# Patient Record
Sex: FEMALE | NOT HISPANIC OR LATINO | URBAN - NONMETROPOLITAN AREA
[De-identification: names, ages, dates, MRNs, and addresses within clinical notes are randomized per-mention and may not be internally consistent; named-entity substitution may affect disease eponyms.]

---

## 2024-08-08 ENCOUNTER — APPOINTMENT (OUTPATIENT)
Dept: CARDIOLOGY | Facility: CLINIC | Age: 64
End: 2024-08-08
Payer: MEDICARE

## 2024-08-08 VITALS
DIASTOLIC BLOOD PRESSURE: 84 MMHG | SYSTOLIC BLOOD PRESSURE: 152 MMHG | BODY MASS INDEX: 26.29 KG/M2 | WEIGHT: 154 LBS | HEART RATE: 100 BPM | HEIGHT: 64 IN

## 2024-08-08 DIAGNOSIS — F17.210 CIGARETTE NICOTINE DEPENDENCE, UNCOMPLICATED: ICD-10-CM

## 2024-08-08 DIAGNOSIS — R07.9 CHEST PAIN, UNSPECIFIED TYPE: ICD-10-CM

## 2024-08-08 DIAGNOSIS — E78.2 MIXED HYPERLIPIDEMIA: ICD-10-CM

## 2024-08-08 DIAGNOSIS — M32.9 SYSTEMIC LUPUS ERYTHEMATOSUS, UNSPECIFIED SLE TYPE, UNSPECIFIED ORGAN INVOLVEMENT STATUS (MULTI): Chronic | ICD-10-CM

## 2024-08-08 DIAGNOSIS — I25.10 CORONARY ARTERIOSCLEROSIS IN NATIVE ARTERY: Chronic | ICD-10-CM

## 2024-08-08 DIAGNOSIS — Z95.5 HISTORY OF HEART ARTERY STENT: ICD-10-CM

## 2024-08-08 DIAGNOSIS — R94.31 ABNORMAL EKG: ICD-10-CM

## 2024-08-08 DIAGNOSIS — I10 ESSENTIAL HYPERTENSION: Chronic | ICD-10-CM

## 2024-08-08 DIAGNOSIS — J44.9 CHRONIC OBSTRUCTIVE PULMONARY DISEASE, UNSPECIFIED COPD TYPE (MULTI): ICD-10-CM

## 2024-08-08 DIAGNOSIS — Z13.6 SCREENING FOR AAA (ABDOMINAL AORTIC ANEURYSM): ICD-10-CM

## 2024-08-08 DIAGNOSIS — I21.9 MYOCARDIAL INFARCTION, UNSPECIFIED MI TYPE, UNSPECIFIED ARTERY (MULTI): ICD-10-CM

## 2024-08-08 DIAGNOSIS — Z79.4 TYPE 2 DIABETES MELLITUS WITH OTHER CIRCULATORY COMPLICATION, WITH LONG-TERM CURRENT USE OF INSULIN (MULTI): Chronic | ICD-10-CM

## 2024-08-08 DIAGNOSIS — E11.59 TYPE 2 DIABETES MELLITUS WITH OTHER CIRCULATORY COMPLICATION, WITH LONG-TERM CURRENT USE OF INSULIN (MULTI): Chronic | ICD-10-CM

## 2024-08-08 DIAGNOSIS — I73.9 PVD (PERIPHERAL VASCULAR DISEASE) (CMS-HCC): ICD-10-CM

## 2024-08-08 PROBLEM — E78.5 HYPERLIPIDEMIA, UNSPECIFIED: Status: ACTIVE | Noted: 2023-07-07

## 2024-08-08 PROCEDURE — 3078F DIAST BP <80 MM HG: CPT | Performed by: INTERNAL MEDICINE

## 2024-08-08 PROCEDURE — 1159F MED LIST DOCD IN RCRD: CPT | Performed by: INTERNAL MEDICINE

## 2024-08-08 PROCEDURE — 3077F SYST BP >= 140 MM HG: CPT | Performed by: INTERNAL MEDICINE

## 2024-08-08 PROCEDURE — 93000 ELECTROCARDIOGRAM COMPLETE: CPT | Performed by: INTERNAL MEDICINE

## 2024-08-08 PROCEDURE — 99204 OFFICE O/P NEW MOD 45 MIN: CPT | Performed by: INTERNAL MEDICINE

## 2024-08-08 PROCEDURE — 4004F PT TOBACCO SCREEN RCVD TLK: CPT | Performed by: INTERNAL MEDICINE

## 2024-08-08 PROCEDURE — 4010F ACE/ARB THERAPY RXD/TAKEN: CPT | Performed by: INTERNAL MEDICINE

## 2024-08-08 RX ORDER — INSULIN DEGLUDEC 100 U/ML
20 INJECTION, SOLUTION SUBCUTANEOUS
COMMUNITY
Start: 2023-11-22

## 2024-08-08 RX ORDER — HYDROXYCHLOROQUINE SULFATE 200 MG/1
200 TABLET, FILM COATED ORAL
COMMUNITY

## 2024-08-08 RX ORDER — VIT C/E/ZN/COPPR/LUTEIN/ZEAXAN 250MG-90MG
1000 CAPSULE ORAL
COMMUNITY

## 2024-08-08 RX ORDER — ROSUVASTATIN CALCIUM 10 MG/1
10 TABLET, COATED ORAL
COMMUNITY
Start: 2024-07-30

## 2024-08-08 RX ORDER — ASPIRIN 81 MG/1
81 TABLET ORAL DAILY
COMMUNITY

## 2024-08-08 RX ORDER — CEPHALEXIN 250 MG/1
1 CAPSULE ORAL
COMMUNITY
Start: 2024-03-14

## 2024-08-08 RX ORDER — LOSARTAN POTASSIUM 50 MG/1
100 TABLET ORAL DAILY
COMMUNITY
Start: 2024-03-14 | End: 2025-03-14

## 2024-08-08 RX ORDER — NITROGLYCERIN 0.4 MG/1
0.4 TABLET SUBLINGUAL EVERY 5 MIN PRN
Qty: 15 TABLET | Refills: 1 | Status: SHIPPED | OUTPATIENT
Start: 2024-08-08 | End: 2025-08-08

## 2024-08-08 RX ORDER — INSULIN ASPART 100 [IU]/ML
INJECTION, SOLUTION INTRAVENOUS; SUBCUTANEOUS
COMMUNITY
Start: 2023-11-22

## 2024-08-08 NOTE — LETTER
August 8, 2024     Renay Lazcano MD  [address] [facility] [address]    Patient: Jayla Patel   YOB: 1960   Date of Visit: 8/8/2024       Dear Dr. Renay Lazcano MD:    Thank you for referring Jayla Patel to me for evaluation. Below are my notes for this consultation.  If you have questions, please do not hesitate to call me. I look forward to following your patient along with you.       Sincerely,     Autumn Yeboah MD      CC: No Recipients  ______________________________________________________________________________________    Referred Alysa  for New Patient Visit (CP HTN)     History Of Present Illness:    Jayla Patel is a 63 y.o. female presenting with multiple cardiac risk factors is accompanied by her  Victor Manuel Patel to the office.  Patient has history of atherosclerotic native vessel coronary artery disease with remote anterior myocardial infarction, she says she has 1 stent as a result.  She has hypertension insulin requiring diabetes, she is also a smoker who is trying to cut back, currently 5 cigarettes a day.    Recently saw primary MD regarding left-sided chest and arm discomfort.  Patient says it was an aching sensation that would come and go without any specific reason.  At times she felt that the left side of her face would go numb.  The symptoms have abated with discontinuation of atorvastatin, and initiation of rosuvastatin.  Patient had STEPHANE of both lower extremities which shows some evidence of peripheral vascular disease, she does not complain of classic claudication type symptoms, but does have aches and pains in multiple joints.  She gives a history of connective tissue disorder, lupus.  She is status post partial hysterectomy, left-sided lung surgery for collapsed lung, bilateral rotator cuff repair.  Her brother had heart attack in his late 50s/early 60s.  12 point review of systems is negative or noncontributory except as noted in particular  she denies palpitations lightheadedness presyncope syncope orthopnea PND lower extremity edema bleeding diathesis or claudication.  She does more than 4 METS of activity on a regular basis..      Past Medical History:-Per review of primary care MDs notes    Acute myocardial infarction (CMS/[facility])   unspecified site, episode of care unspecified   Chronic airway obstruction (CMS/[facility])   not elsewhere classified   Colon polyps    Constipation   Coronary atherosclerosis (CMS/[facility])   Coronary atherosclerosis of unspecified type of vessel, native or graft   Diabetes mellitus (CMS/[facility])   without mention of complication, type II or unspecified type, not stated as uncontrolled   Disorder of bone and cartilage   unspecified   Essential hypertension (CMS/[facility])   unspecified   Gastroparesis   MI (myocardial infarction) (CMS/[facility]) 2013   History of hospitalization   Neurogenic bladder   NOS   Nondependent tobacco use disorder   Other chest pain 2018   Pain in joint, multiple sites   Sjogren's syndrome (CMS/[facility])   Symptomatic menopausal or female climacteric states   Systemic lupus erythematosus (CMS/[facility])   Visual disturbances   Past Surgical History:  She has a past surgical history that includes Hysterectomy;  section, classic; Rotator cuff repair (Bilateral); Lung surgery; Coronary stent placement; Cardiac catheterization; Carpal tunnel release (Left); and Colonoscopy.      Social History:  She reports that she has been smoking cigarettes. She has never used smokeless tobacco. She reports current alcohol use. She reports that she does not use drugs.    Family History:  Family History   Problem Relation Name Age of Onset   • Diabetes Mother     • Esophageal cancer Father     • Lung cancer Father     • Diabetes Brother          Allergies:  Lisinopril, Sulfa (sulfonamide antibiotics), Ciprofloxacin, and Morphine intolerance to atorvastatin    Outpatient Medications:  Current Outpatient Medications   Medication  "Instructions   • aspirin 81 mg, oral, Daily   • cephalexin (Keflex) 250 mg capsule 1 capsule, oral, Daily before breakfast   • cholecalciferol (VITAMIN D-3) 1,000 Units, oral, Daily RT   • hydroxychloroquine (PLAQUENIL) 200 mg, oral, Daily RT   • insulin degludec (TRESIBA FLEXTOUCH) 20 Units, subcutaneous   • losartan (COZAAR) 50 mg, oral, Daily RT   • NovoLOG Flexpen U-100 Insulin 100 unit/mL (3 mL) pen Use 3 units before meals and per ISS sliding scale, 1 unit for every 10 carbs   • rosuvastatin (CRESTOR) 10 mg, oral, Daily RT        Last Recorded Vitals:  Vitals:    08/08/24 1457 08/08/24 1501 08/08/24 1600 08/08/24 1601   BP: 150/80 144/72 162/86 152/84   BP Location: Left arm Right arm Right arm Left arm   Patient Position: Sitting Sitting Sitting Sitting   Pulse: 100 100     Weight: 69.9 kg (154 lb)      Height: 1.613 m (5' 3.5\")          Physical Exam:    GENERAL APPEARANCE: Well developed, well nourished, in no acute distress.  CHEST: Symmetric and non-tender.  Well-healed left lateral chest wall scar of prior surgery for collapsed lung  INTEGUMENT: Skin warm and dry, without gross excoriationis or lesions.  HEENT: No gross abnormalities, no jugular venous distention no carotid bruit or scleral icterus  NECK: Supple, no JVD, no bruit. Thyroid not palpable. Carotid upstrokes normal.  NEURO/PSHCY: Alert and oriented x3; appropriate behavior and responses and responses, with normal balance and coordination  LUNGS: Clear to auscultation bilaterally; normal respiratory effort.  Breath sounds are distant  HEART: Rate and rhythm regular with no evident murmur; no gallop appreciated. There are no rubs, clicks or heaves.   ABDOMEN: Soft, nontender, no masses  or bruits.   MUSCULOSKELETAL: No obvious deformity identified  EXTREMITIES: There is no edema noted.  PERIPHERAL VASCULAR: Able to Doppler dorsalis pedis and posterior tibial in both lower extremities on the right side 2 to 3+ out of 4 Doppler signal on the left " 2+ out of 4 3.  Femoral pulses are difficult to palpate probably 2+ out of 4 plus, did not appreciate any femoral arterial bruit.         Labs reviewed today : Lipid profile June 2024 total cholesterol 157 HDL 71 triglycerides 83 LDL 69 sodium 139 potassium 4.3 GFR 97 liver enzymes normal glucose 89    Assessment/Plan      1. Mixed hyperlipidemia        2. Essential hypertension        3. Coronary arteriosclerosis in native artery        4. Abnormal EKG        5. Myocardial infarction, unspecified MI type, unspecified artery (Multi)        6. History of heart artery stent        7. Type 2 diabetes mellitus with other circulatory complication, with long-term current use of insulin (Multi)        8. Systemic lupus erythematosus, unspecified SLE type, unspecified organ involvement status (Multi)        9. Chronic obstructive pulmonary disease, unspecified COPD type (Multi)        10. Cigarette nicotine dependence, uncomplicated        11. Screening for AAA (abdominal aortic aneurysm)        12. Chest pain, unspecified type           13. 2013 : Based on record review, drug-eluting stent to mid RCA 2013 stress perfusion imaging-2018-stage III Eduardo protocol 93% age-predicted maximum heart rate stopped due to fatigue Pond treadmill score of 7 breast attenuation and motion artifact present normal LV systolic function normal LV wall motion mild mid anterior wall defect small in size that is reversible consistent with motion artifact and breast attenuation    14.  STEPHANE April 2023-right side 1.14 posterior tibial 0.954 dorsalis pedis toe brachial index 0.52 normal being 0.7 or greater on the left posterior tibial 1.14 dorsalis pedis 1.05 toe brachial index 0.54  15.  EKG abnormal with old anterior septal myocardial infarction  16.  Vascular ultrasound abdominal aortic aneurysm screening-September 2020-no evidence of AAA  17.  CT angio of the chest 2023-no thoracic aortic aneurysm, bilateral adrenal lesions, too dense to be  considered benign adenoma, thoracic aorta 3.7 cm.  Left adrenal mass 33.5 mm right adrenal mass 29.6 mm contrast MRI recommended.    Clinical decision makin-year-old female with history of atherosclerotic native vessel coronary artery disease recent left arm and chest discomfort which improved with discontinuation of atorvastatin, suboptimal blood pressure control, mild to moderate peripheral vascular disease, probably below-knee, nicotine dependence, insulin requiring diabetes, presents for evaluation.    We will increase the losartan from 50 mg daily to 100 mg daily and check basic metabolic profile in 1 to 2 weeks    We will proceed with [employer] Myoview as patient says she has issues with her foot and will not be able to walk on a treadmill  Echocardiogram will be ordered  Screening for abdominal aortic aneurysm in  was negative  PVR rest and exercise  Follow-up after testing  Prescription for sublingual nitroglycerin was provided  Nicotine cessation was encouraged.  Appropriate use of sublingual nitroglycerin was discussed    Additional recommendations to be based on clinical course and findings    Thank you Renay Barrera for allowing me to participate in patient's care, please do not hesitate to call if further questions arise,  Sincerely,    Autumn Yeboah MD [facility]  Provider Attestation - Scribe documentation    All medical record entries made by the Scribe were at my direction and personally dictated by me. I have reviewed the chart and agree that the record accurately reflects my personal performance of the history, physical exam, discussion and plan.

## 2024-08-08 NOTE — PROGRESS NOTES
Referred by  for New Patient Visit (CP HTN)     History Of Present Illness:    Jayla Patel is a 63 y.o. female presenting with multiple cardiac risk factors is accompanied by her  Victor Manuel Patel to the office.  Patient has history of atherosclerotic native vessel coronary artery disease with remote anterior myocardial infarction, she says she has 1 stent as a result.  She has hypertension insulin requiring diabetes, she is also a smoker who is trying to cut back, currently 5 cigarettes a day.    Recently saw primary MD regarding left-sided chest and arm discomfort.  Patient says it was an aching sensation that would come and go without any specific reason.  At times she felt that the left side of her face would go numb.  The symptoms have abated with discontinuation of atorvastatin, and initiation of rosuvastatin.  Patient had STEPHANE of both lower extremities which shows some evidence of peripheral vascular disease, she does not complain of classic claudication type symptoms, but does have aches and pains in multiple joints.  She gives a history of connective tissue disorder, lupus.  She is status post partial hysterectomy, left-sided lung surgery for collapsed lung, bilateral rotator cuff repair.  Her brother had heart attack in his late 50s/early 60s.  12 point review of systems is negative or noncontributory except as noted in particular she denies palpitations lightheadedness presyncope syncope orthopnea PND lower extremity edema bleeding diathesis or claudication.  She does more than 4 METS of activity on a regular basis..      Past Medical History:-Per review of primary care MDs notes    Acute myocardial infarction (CMS/HCC)   unspecified site, episode of care unspecified   Chronic airway obstruction (CMS/HCC)   not elsewhere classified   Colon polyps 2014   Constipation   Coronary atherosclerosis (CMS/HCC)   Coronary atherosclerosis of unspecified type of vessel, native or graft   Diabetes  mellitus (CMS/[facility])   without mention of complication, type II or unspecified type, not stated as uncontrolled   Disorder of bone and cartilage   unspecified   Essential hypertension (CMS/[facility])   unspecified   Gastroparesis   MI (myocardial infarction) (CMS/[facility]) 2013   History of hospitalization   Neurogenic bladder   NOS   Nondependent tobacco use disorder   Other chest pain 2018   Pain in joint, multiple sites   Sjogren's syndrome (CMS/[facility])   Symptomatic menopausal or female climacteric states   Systemic lupus erythematosus (CMS/[facility])   Visual disturbances   Past Surgical History:  She has a past surgical history that includes Hysterectomy;  section, classic; Rotator cuff repair (Bilateral); Lung surgery; Coronary stent placement; Cardiac catheterization; Carpal tunnel release (Left); and Colonoscopy.      Social History:  She reports that she has been smoking cigarettes. She has never used smokeless tobacco. She reports current alcohol use. She reports that she does not use drugs.    Family History:  Family History   Problem Relation Name Age of Onset    Diabetes Mother      Esophageal cancer Father      Lung cancer Father      Diabetes Brother          Allergies:  Lisinopril, Sulfa (sulfonamide antibiotics), Ciprofloxacin, and Morphine intolerance to atorvastatin    Outpatient Medications:  Current Outpatient Medications   Medication Instructions    aspirin 81 mg, oral, Daily    cephalexin (Keflex) 250 mg capsule 1 capsule, oral, Daily before breakfast    cholecalciferol (VITAMIN D-3) 1,000 Units, oral, Daily RT    hydroxychloroquine (PLAQUENIL) 200 mg, oral, Daily RT    insulin degludec (TRESIBA FLEXTOUCH) 20 Units, subcutaneous    losartan (COZAAR) 50 mg, oral, Daily RT    NovoLOG Flexpen U-100 Insulin 100 unit/mL (3 mL) pen Use 3 units before meals and per ISS sliding scale, 1 unit for every 10 carbs    rosuvastatin (CRESTOR) 10 mg, oral, Daily RT        Last Recorded Vitals:  Vitals:     "08/08/24 1457 08/08/24 1501 08/08/24 1600 08/08/24 1601   BP: 150/80 144/72 162/86 152/84   BP Location: Left arm Right arm Right arm Left arm   Patient Position: Sitting Sitting Sitting Sitting   Pulse: 100 100     Weight: 69.9 kg (154 lb)      Height: 1.613 m (5' 3.5\")          Physical Exam:    GENERAL APPEARANCE: Well developed, well nourished, in no acute distress.  CHEST: Symmetric and non-tender.  Well-healed left lateral chest wall scar of prior surgery for collapsed lung  INTEGUMENT: Skin warm and dry, without gross excoriationis or lesions.  HEENT: No gross abnormalities, no jugular venous distention no carotid bruit or scleral icterus  NECK: Supple, no JVD, no bruit. Thyroid not palpable. Carotid upstrokes normal.  NEURO/PSHCY: Alert and oriented x3; appropriate behavior and responses and responses, with normal balance and coordination  LUNGS: Clear to auscultation bilaterally; normal respiratory effort.  Breath sounds are distant  HEART: Rate and rhythm regular with no evident murmur; no gallop appreciated. There are no rubs, clicks or heaves.   ABDOMEN: Soft, nontender, no masses  or bruits.   MUSCULOSKELETAL: No obvious deformity identified  EXTREMITIES: There is no edema noted.  PERIPHERAL VASCULAR: Able to Doppler dorsalis pedis and posterior tibial in both lower extremities on the right side 2 to 3+ out of 4 Doppler signal on the left 2+ out of 4 3.  Femoral pulses are difficult to palpate probably 2+ out of 4 plus, did not appreciate any femoral arterial bruit.         Labs reviewed today : Lipid profile June 2024 total cholesterol 157 HDL 71 triglycerides 83 LDL 69 sodium 139 potassium 4.3 GFR 97 liver enzymes normal glucose 89    Assessment/Plan       1. Mixed hyperlipidemia        2. Essential hypertension        3. Coronary arteriosclerosis in native artery        4. Abnormal EKG        5. Myocardial infarction, unspecified MI type, unspecified artery (Multi)        6. History of heart artery " stent        7. Type 2 diabetes mellitus with other circulatory complication, with long-term current use of insulin (Multi)        8. Systemic lupus erythematosus, unspecified SLE type, unspecified organ involvement status (Multi)        9. Chronic obstructive pulmonary disease, unspecified COPD type (Multi)        10. Cigarette nicotine dependence, uncomplicated        11. Screening for AAA (abdominal aortic aneurysm)        12. Chest pain, unspecified type           13. 2013 : Based on record review, drug-eluting stent to mid RCA  stress perfusion imaging--stage III Eduardo protocol 93% age-predicted maximum heart rate stopped due to fatigue Pond treadmill score of 7 breast attenuation and motion artifact present normal LV systolic function normal LV wall motion mild mid anterior wall defect small in size that is reversible consistent with motion artifact and breast attenuation    14.  STEPHANE 2023-right side 1.14 posterior tibial 0.954 dorsalis pedis toe brachial index 0.52 normal being 0.7 or greater on the left posterior tibial 1.14 dorsalis pedis 1.05 toe brachial index 0.54  15.  EKG abnormal with old anterior septal myocardial infarction  16.  Vascular ultrasound abdominal aortic aneurysm screening-2020-no evidence of AAA  17.  CT angio of the chest -no thoracic aortic aneurysm, bilateral adrenal lesions, too dense to be considered benign adenoma, thoracic aorta 3.7 cm.  Left adrenal mass 33.5 mm right adrenal mass 29.6 mm contrast MRI recommended.    Clinical decision makin-year-old female with history of atherosclerotic native vessel coronary artery disease recent left arm and chest discomfort which improved with discontinuation of atorvastatin, suboptimal blood pressure control, mild to moderate peripheral vascular disease, probably below-knee, nicotine dependence, insulin requiring diabetes, presents for evaluation.    We will increase the losartan from 50 mg daily to 100 mg  daily and check basic metabolic profile in 1 to 2 weeks    We will proceed with Lexiscan Myoview as patient says she has issues with her foot and will not be able to walk on a treadmill  Echocardiogram will be ordered  Screening for abdominal aortic aneurysm in 2020 was negative  PVR rest and exercise  Follow-up after testing  Prescription for sublingual nitroglycerin was provided  Nicotine cessation was encouraged.  Appropriate use of sublingual nitroglycerin was discussed    Additional recommendations to be based on clinical course and findings    Thank you Renay Barrera for allowing me to participate in patient's care, please do not hesitate to call if further questions arise,  Sincerely,    Autumn Yeboah MD [facility]  Provider Attestation - Scribe documentation    All medical record entries made by the Scribe were at my direction and personally dictated by me. I have reviewed the chart and agree that the record accurately reflects my personal performance of the history, physical exam, discussion and plan.

## 2024-08-26 ENCOUNTER — HOSPITAL ENCOUNTER (OUTPATIENT)
Dept: RADIOLOGY | Facility: CLINIC | Age: 64
Discharge: HOME | End: 2024-08-26
Payer: MEDICARE

## 2024-08-26 ENCOUNTER — TELEPHONE (OUTPATIENT)
Dept: CARDIOLOGY | Facility: CLINIC | Age: 64
End: 2024-08-26
Payer: MEDICARE

## 2024-08-26 ENCOUNTER — HOSPITAL ENCOUNTER (OUTPATIENT)
Dept: CARDIOLOGY | Facility: CLINIC | Age: 64
Discharge: HOME | End: 2024-08-26
Payer: MEDICARE

## 2024-08-26 VITALS — DIASTOLIC BLOOD PRESSURE: 92 MMHG | HEART RATE: 78 BPM | SYSTOLIC BLOOD PRESSURE: 156 MMHG

## 2024-08-26 DIAGNOSIS — I25.10 CORONARY ARTERIOSCLEROSIS IN NATIVE ARTERY: Chronic | ICD-10-CM

## 2024-08-26 DIAGNOSIS — Z95.5 HISTORY OF HEART ARTERY STENT: ICD-10-CM

## 2024-08-26 DIAGNOSIS — I21.9 MYOCARDIAL INFARCTION, UNSPECIFIED MI TYPE, UNSPECIFIED ARTERY (MULTI): ICD-10-CM

## 2024-08-26 PROCEDURE — 93017 CV STRESS TEST TRACING ONLY: CPT

## 2024-08-26 PROCEDURE — 3430000001 HC RX 343 DIAGNOSTIC RADIOPHARMACEUTICALS: Performed by: INTERNAL MEDICINE

## 2024-08-26 PROCEDURE — 2500000004 HC RX 250 GENERAL PHARMACY W/ HCPCS (ALT 636 FOR OP/ED): Performed by: INTERNAL MEDICINE

## 2024-08-26 PROCEDURE — 78452 HT MUSCLE IMAGE SPECT MULT: CPT

## 2024-08-26 PROCEDURE — A9502 TC99M TETROFOSMIN: HCPCS | Performed by: INTERNAL MEDICINE

## 2024-08-26 RX ORDER — REGADENOSON 0.08 MG/ML
0.4 INJECTION, SOLUTION INTRAVENOUS ONCE
Status: COMPLETED | OUTPATIENT
Start: 2024-08-26 | End: 2024-08-26

## 2024-08-26 NOTE — TELEPHONE ENCOUNTER
PCP office phoned states patient was in ER at [facility] over the weekend with BP ranging 204/114-153/92, ER started pt on amlodipine 5 mg daily, losartan 100mg daily. In our office today for stress testing, states she felt just fine BP ranging around 150/90. Will follow up with PCP later this week. Inquiring if pt should follow up with you sooner. Denies having cardiac complaints.    To Dr. Autumn Yeboah MD for review.

## 2024-08-27 NOTE — TELEPHONE ENCOUNTER
Phoned patient states she did follow up with PCP, new medication started. Metoprolol 12.5mg daily, unsure if it is long or short acting will update med list at upcoming OV. Patient states her BP is with recent medication changes.

## 2024-09-13 ENCOUNTER — HOSPITAL ENCOUNTER (OUTPATIENT)
Dept: CARDIOLOGY | Facility: CLINIC | Age: 64
Discharge: HOME | End: 2024-09-13
Payer: MEDICARE

## 2024-09-13 VITALS
HEIGHT: 63 IN | SYSTOLIC BLOOD PRESSURE: 152 MMHG | DIASTOLIC BLOOD PRESSURE: 90 MMHG | BODY MASS INDEX: 27.29 KG/M2 | WEIGHT: 154 LBS

## 2024-09-13 DIAGNOSIS — R07.9 CHEST PAIN, UNSPECIFIED TYPE: ICD-10-CM

## 2024-09-13 DIAGNOSIS — E78.2 MIXED HYPERLIPIDEMIA: ICD-10-CM

## 2024-09-13 DIAGNOSIS — F17.210 CIGARETTE NICOTINE DEPENDENCE, UNCOMPLICATED: ICD-10-CM

## 2024-09-13 DIAGNOSIS — Z95.5 HISTORY OF HEART ARTERY STENT: ICD-10-CM

## 2024-09-13 DIAGNOSIS — I25.10 CORONARY ARTERIOSCLEROSIS IN NATIVE ARTERY: Chronic | ICD-10-CM

## 2024-09-13 DIAGNOSIS — I21.9 MYOCARDIAL INFARCTION, UNSPECIFIED MI TYPE, UNSPECIFIED ARTERY (MULTI): ICD-10-CM

## 2024-09-13 DIAGNOSIS — F17.200 NICOTINE DEPENDENCE, UNSPECIFIED, UNCOMPLICATED: ICD-10-CM

## 2024-09-13 DIAGNOSIS — I73.9 PVD (PERIPHERAL VASCULAR DISEASE) (CMS-HCC): ICD-10-CM

## 2024-09-13 DIAGNOSIS — Z13.6 SCREENING FOR AAA (ABDOMINAL AORTIC ANEURYSM): ICD-10-CM

## 2024-09-13 LAB — BODY SURFACE AREA: 1.76 M2

## 2024-09-13 PROCEDURE — 76706 US ABDL AORTA SCREEN AAA: CPT | Performed by: INTERNAL MEDICINE

## 2024-09-13 PROCEDURE — 93924 LWR XTR VASC STDY BILAT: CPT

## 2024-09-13 PROCEDURE — 93924 LWR XTR VASC STDY BILAT: CPT | Performed by: INTERNAL MEDICINE

## 2024-09-13 PROCEDURE — 76706 US ABDL AORTA SCREEN AAA: CPT

## 2024-09-13 PROCEDURE — 93306 TTE W/DOPPLER COMPLETE: CPT

## 2024-09-16 LAB
AORTIC VALVE MEAN GRADIENT: 3 MMHG
AORTIC VALVE PEAK VELOCITY: 1.2 M/S
AV PEAK GRADIENT: 5.8 MMHG
AVA (PEAK VEL): 2.54 CM2
AVA (VTI): 2.63 CM2
EJECTION FRACTION APICAL 4 CHAMBER: 61.9
EJECTION FRACTION: 60 %
LEFT VENTRICLE INTERNAL DIMENSION DIASTOLE: 4.52 CM (ref 3.5–6)
LEFT VENTRICULAR OUTFLOW TRACT DIAMETER: 2.1 CM
LV EJECTION FRACTION BIPLANE: 63 %
MITRAL VALVE E/A RATIO: 0.96
MITRAL VALVE E/E' RATIO: 15.3

## 2024-09-23 ENCOUNTER — APPOINTMENT (OUTPATIENT)
Dept: CARDIOLOGY | Facility: CLINIC | Age: 64
End: 2024-09-23
Payer: MEDICARE

## 2024-09-23 VITALS
SYSTOLIC BLOOD PRESSURE: 150 MMHG | DIASTOLIC BLOOD PRESSURE: 82 MMHG | HEART RATE: 80 BPM | HEIGHT: 63 IN | WEIGHT: 155 LBS | BODY MASS INDEX: 27.46 KG/M2

## 2024-09-23 DIAGNOSIS — F17.210 CIGARETTE NICOTINE DEPENDENCE, UNCOMPLICATED: ICD-10-CM

## 2024-09-23 DIAGNOSIS — I25.10 CORONARY ARTERIOSCLEROSIS IN NATIVE ARTERY: Chronic | ICD-10-CM

## 2024-09-23 DIAGNOSIS — I10 WHITE COAT SYNDROME WITH DIAGNOSIS OF HYPERTENSION: ICD-10-CM

## 2024-09-23 DIAGNOSIS — I10 ESSENTIAL HYPERTENSION: Chronic | ICD-10-CM

## 2024-09-23 DIAGNOSIS — J44.9 CHRONIC OBSTRUCTIVE PULMONARY DISEASE, UNSPECIFIED COPD TYPE (MULTI): ICD-10-CM

## 2024-09-23 DIAGNOSIS — Z79.4 TYPE 2 DIABETES MELLITUS WITH OTHER CIRCULATORY COMPLICATION, WITH LONG-TERM CURRENT USE OF INSULIN: Chronic | ICD-10-CM

## 2024-09-23 DIAGNOSIS — M32.9 SYSTEMIC LUPUS ERYTHEMATOSUS, UNSPECIFIED SLE TYPE, UNSPECIFIED ORGAN INVOLVEMENT STATUS (MULTI): Chronic | ICD-10-CM

## 2024-09-23 DIAGNOSIS — Z95.5 HISTORY OF HEART ARTERY STENT: ICD-10-CM

## 2024-09-23 DIAGNOSIS — I21.9 MYOCARDIAL INFARCTION, UNSPECIFIED MI TYPE, UNSPECIFIED ARTERY (MULTI): ICD-10-CM

## 2024-09-23 DIAGNOSIS — E78.2 MIXED HYPERLIPIDEMIA: ICD-10-CM

## 2024-09-23 DIAGNOSIS — E11.59 TYPE 2 DIABETES MELLITUS WITH OTHER CIRCULATORY COMPLICATION, WITH LONG-TERM CURRENT USE OF INSULIN: Chronic | ICD-10-CM

## 2024-09-23 PROCEDURE — 3077F SYST BP >= 140 MM HG: CPT | Performed by: INTERNAL MEDICINE

## 2024-09-23 PROCEDURE — 4004F PT TOBACCO SCREEN RCVD TLK: CPT | Performed by: INTERNAL MEDICINE

## 2024-09-23 PROCEDURE — 99214 OFFICE O/P EST MOD 30 MIN: CPT | Performed by: INTERNAL MEDICINE

## 2024-09-23 PROCEDURE — 1159F MED LIST DOCD IN RCRD: CPT | Performed by: INTERNAL MEDICINE

## 2024-09-23 PROCEDURE — 4010F ACE/ARB THERAPY RXD/TAKEN: CPT | Performed by: INTERNAL MEDICINE

## 2024-09-23 PROCEDURE — 3079F DIAST BP 80-89 MM HG: CPT | Performed by: INTERNAL MEDICINE

## 2024-09-23 PROCEDURE — 3008F BODY MASS INDEX DOCD: CPT | Performed by: INTERNAL MEDICINE

## 2024-09-23 RX ORDER — METOPROLOL SUCCINATE 25 MG/1
25 TABLET, EXTENDED RELEASE ORAL DAILY
COMMUNITY

## 2024-09-23 NOTE — PATIENT INSTRUCTIONS
Please bring all medicines, vitamins, and herbal supplements with you when you come to the office.    Prescriptions will not be filled unless you are compliant with your follow up appointments or have a follow up appointment scheduled as per instruction of your physician. Refills should be requested at the time of your visit.   BMI was above normal measurement. Current weight: 70.3 kg (155 lb)  Weight change since last visit (-) denotes wt loss 1 lbs   Weight loss needed to achieve BMI 25: 14.2 Lbs  Weight loss needed to achieve BMI 30: -14 Lbs  Advised to Increase physical activity.

## 2024-09-23 NOTE — PROGRESS NOTES
Patient was seen most recently on 8/8/2024 initially, at which time we ordered perfusion imaging study echocardiogram ultrasound of the abdominal aorta and PVR rest and exercise.  Has multiple risk factors, was also complaining of left-sided chest and arm discomfort.    Subjective :     Denies any chest discomfort pressure tightness or heaviness working on nicotine cessation reviewed laboratory data and results of testing to include echocardiogram PVR, perfusion imaging study ultrasound of the abdominal aorta and blood work.      History so Far :    Acute myocardial infarction (CMS/HCC)   unspecified site, episode of care unspecified   Chronic airway obstruction (CMS/HCC)   not elsewhere classified   Colon polyps 2014   Constipation   Coronary atherosclerosis (CMS/HCC)   Coronary atherosclerosis of unspecified type of vessel, native or graft   Diabetes mellitus (CMS/HCC)   without mention of complication, type II or unspecified type, not stated as uncontrolled   Disorder of bone and cartilage   unspecified   Essential hypertension (CMS/HCC)   unspecified   Gastroparesis   MI (myocardial infarction) (CMS/HCC) 02/2013   History of hospitalization   Neurogenic bladder   NOS   Nondependent tobacco use disorder   Other chest pain 09/25/2018   Pain in joint, multiple sites   Sjogren's syndrome (CMS/HCC)   Symptomatic menopausal or female climacteric states   Systemic lupus erythematosus (CMS/HCC)   Visual disturbances   Patient has an abnormal EKG with pattern of anterior septal myocardial infarction dating back to June 2024.  Lexiscan Myoview August 2024-normal LVEF 55% transient ischemic dilatation 1.2  PVR rest September 2024-no evidence of arterial occlusive disease bilaterally in the lower extremities at rest, I did not see exercise PVR.  Screening ultrasound of the abdominal aorta September 2024-no evidence of abdominal aortic aneurysm  Echocardiogram September 2024 LVEF 60% impaired relaxation pattern of LV  "diastolic filling left atrium 3.6 cm normal RV systolic function grossly normal valves no tricuspid regurgitation hence PA pressure was not estimated  Objective   Wt Readings from Last 3 Encounters:   09/23/24 70.3 kg (155 lb)   09/13/24 69.9 kg (154 lb)   08/08/24 69.9 kg (154 lb)            Vitals:    09/23/24 0952 09/23/24 1017   BP: 164/88 150/82   BP Location: Left arm Right arm   Patient Position: Sitting Sitting   Pulse: 80    Weight: 70.3 kg (155 lb)    Height: 1.6 m (5' 3\")                 Physical Exam:  Blood pressure was elevated in the office, recheck blood pressure was also above target, however patient had several blood pressure readings from outside the office environment that was in the 1 20-1 30s systolic and 70s diastolic.  At last visit I had suggested that she uptitrate the losartan from 50 mg daily to 100 mg daily.  She says her blood pressure escalated, she was seen in the emergency department, losartan was decreased from 100 mg daily to 50 mg daily and metoprolol succinate 25 mg daily was added.  GENERAL APPEARANCE: in no acute distress.  CHEST: Symmetric and non-tender.  INTEGUMENT: Skin warm and dry  HEENT: No gross abnormalities identified.No pallor or scleral icterus.  NECK: Supple, no JVD, no bruit.   NEURO/PSHCY: Alert and oriented x3; appropriate behavior and responses and responses  LUNGS: Clear to auscultation bilaterally; normal respiratory effort.  HEART: Rate and rhythm regular with no evident murmur; no gallop appreciated.   ABDOMEN: Soft, non tender.  MUSCULOSKELETAL: No gross deformities.  EXTREMITIES: Warm  There is no edema noted.    Meds:  Current Outpatient Medications   Medication Instructions    aspirin 81 mg, oral, Daily    cephalexin (Keflex) 250 mg capsule 1 capsule, oral, Daily before breakfast    cholecalciferol (VITAMIN D-3) 1,000 Units, oral, Daily RT    hydroxychloroquine (PLAQUENIL) 200 mg, oral, Daily RT    insulin degludec (TRESIBA FLEXTOUCH) 20 Units, " "subcutaneous    losartan (COZAAR) 50 mg, oral, Daily    metoprolol succinate XL (TOPROL-XL) 25 mg, oral, Daily, Do not crush or chew.    nitroglycerin (NITROSTAT) 0.4 mg, sublingual, Every 5 min PRN, May repeat dose every 5 minutes for up to 3 doses total.    NovoLOG Flexpen U-100 Insulin 100 unit/mL (3 mL) pen Use 3 units before meals and per ISS sliding scale, 1 unit for every 10 carbs    rosuvastatin (CRESTOR) 10 mg, oral, Daily RT          Allergies   Allergen Reactions    Lisinopril Anaphylaxis     Other Reaction(s): \"face swelled    Sulfa (Sulfonamide Antibiotics) Anaphylaxis    Ciprofloxacin Hives    Morphine Hives             LABS:    August 2024-BUN 19 creatinine 0.76 GFR 87 sodium 143 potassium 4.4          Total cholesterol 133 HDL 62 LDL 62, triglyceride 40 5 August 2021    Patient Active Problem List    Diagnosis Date Noted    BMI 27.0-27.9,adult 09/23/2024    Abnormal EKG 08/08/2024    History of heart artery stent 08/08/2024    Hyperlipidemia, unspecified 07/07/2023    Myocardial infarct (Multi) 07/07/2023    Cigarette nicotine dependence, uncomplicated 07/07/2023    Chronic obstructive pulmonary disease (Multi) 07/07/2023    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 Myocardial infarction, unspecified MI type, unspecified artery (Multi)        2. History of heart artery stent        3. Coronary arteriosclerosis in native artery        4. Essential hypertension  Follow Up In Cardiology      5. Mixed hyperlipidemia        6. Type 2 diabetes mellitus with other circulatory complication, with long-term current use of insulin (Multi)        7. Systemic lupus erythematosus, unspecified SLE type, unspecified organ involvement status (Multi)        8. " Chronic obstructive pulmonary disease, unspecified COPD type (Multi)        9. Cigarette nicotine dependence, uncomplicated        10. BMI 27.0-27.9,adult        11. White coat syndrome with diagnosis of hypertension        At this point we will not increase any medications, but have patient continue to monitor blood pressures outside the office environment and take list to primary care.  I will plan on seeing her back in 1 year or sooner if interval problems arise.  The dynamic nature of coronary artery disease and the importance of seeking prompt medical attention if new symptoms develop was also discussed.    Follow up : 1 year    Comprehensive profile and lipid profile prior to next visit.    Provider Attestation - Scribe documentation    All medical record entries made by the Scribe were at my direction and personally dictated by me. I have reviewed the chart and agree that the record accurately reflects my personal performance of the history, physical exam, discussion and plan.   Scribe Attestation  By signing my name below, I, Marbin Lackey LPN   attest that this documentation has been prepared under the direction and in the presence of Autumn Yeboah MD.

## 2024-09-23 NOTE — PROGRESS NOTES
Subjective   Jayla Patel is a 63 y.o. female       Chief Complaint    Follow-up          HPI     ROS         There were no vitals filed for this visit.     Objective   Physical Exam    Allergies  Lisinopril, Sulfa (sulfonamide antibiotics), Ciprofloxacin, and Morphine     Current Medications    Current Outpatient Medications:     aspirin 81 mg EC tablet, Take 1 tablet (81 mg) by mouth once daily., Disp: , Rfl:     cephalexin (Keflex) 250 mg capsule, Take 1 capsule (250 mg) by mouth once daily in the morning. Take before meals., Disp: , Rfl:     cholecalciferol (Vitamin D-3) 25 MCG (1000 UT) capsule, Take 1 capsule (25 mcg) by mouth once daily., Disp: , Rfl:     hydroxychloroquine (Plaquenil) 200 mg tablet, Take 1 tablet (200 mg) by mouth once daily., Disp: , Rfl:     insulin degludec (Tresiba FlexTouch) 100 unit/mL (3 mL) injection, Inject 20 Units under the skin., Disp: , Rfl:     losartan (Cozaar) 50 mg tablet, Take 2 tablets (100 mg) by mouth once daily., Disp: , Rfl:     nitroglycerin (Nitrostat) 0.4 mg SL tablet, Place 1 tablet (0.4 mg) under the tongue every 5 minutes if needed for chest pain (Report to the ER or call 911 after third dose). May repeat dose every 5 minutes for up to 3 doses total., Disp: 15 tablet, Rfl: 1    NovoLOG Flexpen U-100 Insulin 100 unit/mL (3 mL) pen, Use 3 units before meals and per ISS sliding scale, 1 unit for every 10 carbs, Disp: , Rfl:     rosuvastatin (Crestor) 10 mg tablet, Take 1 tablet (10 mg) by mouth once daily., Disp: , Rfl:                      Assessment/Plan   1. Essential hypertension  Follow Up In Cardiology               Scribe Attestation  By signing my name below, I, *** , Scribe   attest that this documentation has been prepared under the direction and in the presence of Autumn Yeboah MD.     Provider Attestation - Scribe documentation    All medical record entries made by the Scribe were at my direction and personally dictated by me. I have reviewed the chart  and agree that the record accurately reflects my personal performance of the history, physical exam, discussion and plan.

## 2024-09-23 NOTE — LETTER
September 23, 2024     Renay Lazcano MD  [address] [facility] [address]    Patient: Jayla Patel   YOB: 1960   Date of Visit: 9/23/2024       Dear Dr. Renay Lazcano MD:    Thank you for referring Jayla Patel to me for evaluation. Below are my notes for this consultation.  If you have questions, please do not hesitate to call me. I look forward to following your patient along with you.       Sincerely,     Autumn Yeboah MD      CC: No Recipients  ______________________________________________________________________________________    Patient was seen most recently on 8/8/2024 initially, at which time we ordered perfusion imaging study echocardiogram ultrasound of the abdominal aorta and PVR rest and exercise.  Has multiple risk factors, was also complaining of left-sided chest and arm discomfort.    Subjective :     Denies any chest discomfort pressure tightness or heaviness working on nicotine cessation reviewed laboratory data and results of testing to include echocardiogram PVR, perfusion imaging study ultrasound of the abdominal aorta and blood work.      History so Far :    Acute myocardial infarction (CMS/HCC)   unspecified site, episode of care unspecified   Chronic airway obstruction (CMS/HCC)   not elsewhere classified   Colon polyps 2014   Constipation   Coronary atherosclerosis (CMS/HCC)   Coronary atherosclerosis of unspecified type of vessel, native or graft   Diabetes mellitus (CMS/HCC)   without mention of complication, type II or unspecified type, not stated as uncontrolled   Disorder of bone and cartilage   unspecified   Essential hypertension (CMS/HCC)   unspecified   Gastroparesis   MI (myocardial infarction) (CMS/HCC) 02/2013   History of hospitalization   Neurogenic bladder   NOS   Nondependent tobacco use disorder   Other chest pain 09/25/2018   Pain in joint, multiple sites   Sjogren's syndrome (CMS/HCC)   Symptomatic menopausal or female climacteric states  "  Systemic lupus erythematosus (CMS/HCC)   Visual disturbances   Patient has an abnormal EKG with pattern of anterior septal myocardial infarction dating back to June 2024.  Lexiscan Myoview August 2024-normal LVEF 55% transient ischemic dilatation 1.2  PVR rest September 2024-no evidence of arterial occlusive disease bilaterally in the lower extremities at rest, I did not see exercise PVR.  Screening ultrasound of the abdominal aorta September 2024-no evidence of abdominal aortic aneurysm  Echocardiogram September 2024 LVEF 60% impaired relaxation pattern of LV diastolic filling left atrium 3.6 cm normal RV systolic function grossly normal valves no tricuspid regurgitation hence PA pressure was not estimated  Objective   Wt Readings from Last 3 Encounters:   09/23/24 70.3 kg (155 lb)   09/13/24 69.9 kg (154 lb)   08/08/24 69.9 kg (154 lb)            Vitals:    09/23/24 0952 09/23/24 1017   BP: 164/88 150/82   BP Location: Left arm Right arm   Patient Position: Sitting Sitting   Pulse: 80    Weight: 70.3 kg (155 lb)    Height: 1.6 m (5' 3\")                 Physical Exam:  Blood pressure was elevated in the office, recheck blood pressure was also above target, however patient had several blood pressure readings from outside the office environment that was in the 1 20-1 30s systolic and 70s diastolic.  At last visit I had suggested that she uptitrate the losartan from 50 mg daily to 100 mg daily.  She says her blood pressure escalated, she was seen in the emergency department, losartan was decreased from 100 mg daily to 50 mg daily and metoprolol succinate 25 mg daily was added.  GENERAL APPEARANCE: in no acute distress.  CHEST: Symmetric and non-tender.  INTEGUMENT: Skin warm and dry  HEENT: No gross abnormalities identified.No pallor or scleral icterus.  NECK: Supple, no JVD, no bruit.   NEURO/PSHCY: Alert and oriented x3; appropriate behavior and responses and responses  LUNGS: Clear to auscultation bilaterally; " "normal respiratory effort.  HEART: Rate and rhythm regular with no evident murmur; no gallop appreciated.   ABDOMEN: Soft, non tender.  MUSCULOSKELETAL: No gross deformities.  EXTREMITIES: Warm  There is no edema noted.    Meds:  Current Outpatient Medications   Medication Instructions   • aspirin 81 mg, oral, Daily   • cephalexin (Keflex) 250 mg capsule 1 capsule, oral, Daily before breakfast   • cholecalciferol (VITAMIN D-3) 1,000 Units, oral, Daily RT   • hydroxychloroquine (PLAQUENIL) 200 mg, oral, Daily RT   • insulin degludec (TRESIBA FLEXTOUCH) 20 Units, subcutaneous   • losartan (COZAAR) 50 mg, oral, Daily   • metoprolol succinate XL (TOPROL-XL) 25 mg, oral, Daily, Do not crush or chew.   • nitroglycerin (NITROSTAT) 0.4 mg, sublingual, Every 5 min PRN, May repeat dose every 5 minutes for up to 3 doses total.   • NovoLOG Flexpen U-100 Insulin 100 unit/mL (3 mL) pen Use 3 units before meals and per ISS sliding scale, 1 unit for every 10 carbs   • rosuvastatin (CRESTOR) 10 mg, oral, Daily RT          Allergies   Allergen Reactions   • Lisinopril Anaphylaxis     Other Reaction(s): \"face swelled   • Sulfa (Sulfonamide Antibiotics) Anaphylaxis   • Ciprofloxacin Hives   • Morphine Hives             LABS:    August 2024-BUN 19 creatinine 0.76 GFR 87 sodium 143 potassium 4.4          Total cholesterol 133 HDL 62 LDL 62, triglyceride 40 5 August 2021    Patient Active Problem List    Diagnosis Date Noted   • BMI 27.0-27.9,adult 09/23/2024   • Abnormal EKG 08/08/2024   • History of heart artery stent 08/08/2024   • Hyperlipidemia, unspecified 07/07/2023   • Myocardial infarct (Multi) 07/07/2023   • Cigarette nicotine dependence, uncomplicated 07/07/2023   • Chronic obstructive pulmonary disease (Multi) 07/07/2023   • Type 2 diabetes mellitus with circulatory disorder (Multi) 09/25/2018   • Chest pain 09/25/2018   • Systemic lupus erythematosus (Multi) 09/25/2018   • White coat syndrome with diagnosis of hypertension " 09/06/2017   • Coronary arteriosclerosis in native artery 09/06/2017                 Assessment:    1. Myocardial infarction, unspecified MI type, unspecified artery (Multi)        2. History of heart artery stent        3. Coronary arteriosclerosis in native artery        4. Essential hypertension  Follow Up In Cardiology      5. Mixed hyperlipidemia        6. Type 2 diabetes mellitus with other circulatory complication, with long-term current use of insulin (Multi)        7. Systemic lupus erythematosus, unspecified SLE type, unspecified organ involvement status (Multi)        8. Chronic obstructive pulmonary disease, unspecified COPD type (Multi)        9. Cigarette nicotine dependence, uncomplicated        10. BMI 27.0-27.9,adult        11. White coat syndrome with diagnosis of hypertension        At this point we will not increase any medications, but have patient continue to monitor blood pressures outside the office environment and take list to primary care.  I will plan on seeing her back in 1 year or sooner if interval problems arise.  The dynamic nature of coronary artery disease and the importance of seeking prompt medical attention if new symptoms develop was also discussed.    Follow up : 1 year    Comprehensive profile and lipid profile prior to next visit.    Provider Attestation - Scribe documentation    All medical record entries made by the Scribe were at my direction and personally dictated by me. I have reviewed the chart and agree that the record accurately reflects my personal performance of the history, physical exam, discussion and plan.   Scribe Attestation  By signing my name below, I, Jazmín Lackey LPNibe   attest that this documentation has been prepared under the direction and in the presence of Autumn Yeboah MD.

## 2025-09-18 ENCOUNTER — APPOINTMENT (OUTPATIENT)
Dept: CARDIOLOGY | Facility: CLINIC | Age: 65
End: 2025-09-18
Payer: MEDICARE

## 2025-09-22 ENCOUNTER — APPOINTMENT (OUTPATIENT)
Dept: CARDIOLOGY | Facility: CLINIC | Age: 65
End: 2025-09-22
Payer: MEDICARE